# Patient Record
Sex: FEMALE | Race: WHITE | NOT HISPANIC OR LATINO | Employment: OTHER | URBAN - METROPOLITAN AREA
[De-identification: names, ages, dates, MRNs, and addresses within clinical notes are randomized per-mention and may not be internally consistent; named-entity substitution may affect disease eponyms.]

---

## 2019-08-15 ENCOUNTER — APPOINTMENT (OUTPATIENT)
Dept: RADIOLOGY | Facility: MEDICAL CENTER | Age: 74
End: 2019-08-15
Attending: EMERGENCY MEDICINE
Payer: COMMERCIAL

## 2019-08-15 ENCOUNTER — HOSPITAL ENCOUNTER (EMERGENCY)
Facility: MEDICAL CENTER | Age: 74
End: 2019-08-15
Attending: EMERGENCY MEDICINE
Payer: COMMERCIAL

## 2019-08-15 VITALS
SYSTOLIC BLOOD PRESSURE: 139 MMHG | HEART RATE: 83 BPM | TEMPERATURE: 97.7 F | BODY MASS INDEX: 35.41 KG/M2 | OXYGEN SATURATION: 95 % | WEIGHT: 212.52 LBS | DIASTOLIC BLOOD PRESSURE: 83 MMHG | HEIGHT: 65 IN | RESPIRATION RATE: 16 BRPM

## 2019-08-15 DIAGNOSIS — M79.601 PAIN OF RIGHT UPPER EXTREMITY: ICD-10-CM

## 2019-08-15 LAB
ANION GAP SERPL CALC-SCNC: 7 MMOL/L (ref 0–11.9)
BASOPHILS # BLD AUTO: 0.6 % (ref 0–1.8)
BASOPHILS # BLD: 0.03 K/UL (ref 0–0.12)
BUN SERPL-MCNC: 15 MG/DL (ref 8–22)
CALCIUM SERPL-MCNC: 9.3 MG/DL (ref 8.5–10.5)
CHLORIDE SERPL-SCNC: 104 MMOL/L (ref 96–112)
CO2 SERPL-SCNC: 27 MMOL/L (ref 20–33)
CREAT SERPL-MCNC: 0.67 MG/DL (ref 0.5–1.4)
EOSINOPHIL # BLD AUTO: 0.14 K/UL (ref 0–0.51)
EOSINOPHIL NFR BLD: 2.8 % (ref 0–6.9)
ERYTHROCYTE [DISTWIDTH] IN BLOOD BY AUTOMATED COUNT: 50.4 FL (ref 35.9–50)
GLUCOSE SERPL-MCNC: 111 MG/DL (ref 65–99)
HCT VFR BLD AUTO: 38.1 % (ref 37–47)
HGB BLD-MCNC: 12 G/DL (ref 12–16)
IMM GRANULOCYTES # BLD AUTO: 0 K/UL (ref 0–0.11)
IMM GRANULOCYTES NFR BLD AUTO: 0 % (ref 0–0.9)
LYMPHOCYTES # BLD AUTO: 1.55 K/UL (ref 1–4.8)
LYMPHOCYTES NFR BLD: 30.5 % (ref 22–41)
MCH RBC QN AUTO: 29.7 PG (ref 27–33)
MCHC RBC AUTO-ENTMCNC: 31.5 G/DL (ref 33.6–35)
MCV RBC AUTO: 94.3 FL (ref 81.4–97.8)
MONOCYTES # BLD AUTO: 0.52 K/UL (ref 0–0.85)
MONOCYTES NFR BLD AUTO: 10.2 % (ref 0–13.4)
NEUTROPHILS # BLD AUTO: 2.84 K/UL (ref 2–7.15)
NEUTROPHILS NFR BLD: 55.9 % (ref 44–72)
NRBC # BLD AUTO: 0 K/UL
NRBC BLD-RTO: 0 /100 WBC
PLATELET # BLD AUTO: 173 K/UL (ref 164–446)
PMV BLD AUTO: 10.6 FL (ref 9–12.9)
POTASSIUM SERPL-SCNC: 4.2 MMOL/L (ref 3.6–5.5)
RBC # BLD AUTO: 4.04 M/UL (ref 4.2–5.4)
SODIUM SERPL-SCNC: 138 MMOL/L (ref 135–145)
TROPONIN T SERPL-MCNC: 11 NG/L (ref 6–19)
WBC # BLD AUTO: 5.1 K/UL (ref 4.8–10.8)

## 2019-08-15 PROCEDURE — 93005 ELECTROCARDIOGRAM TRACING: CPT | Performed by: EMERGENCY MEDICINE

## 2019-08-15 PROCEDURE — 85025 COMPLETE CBC W/AUTO DIFF WBC: CPT

## 2019-08-15 PROCEDURE — 93971 EXTREMITY STUDY: CPT | Mod: 26,RT | Performed by: INTERNAL MEDICINE

## 2019-08-15 PROCEDURE — 80048 BASIC METABOLIC PNL TOTAL CA: CPT

## 2019-08-15 PROCEDURE — 93971 EXTREMITY STUDY: CPT | Mod: RT

## 2019-08-15 PROCEDURE — 84484 ASSAY OF TROPONIN QUANT: CPT

## 2019-08-15 PROCEDURE — 99284 EMERGENCY DEPT VISIT MOD MDM: CPT

## 2019-08-15 SDOH — HEALTH STABILITY: MENTAL HEALTH: HOW OFTEN DO YOU HAVE A DRINK CONTAINING ALCOHOL?: 2-4 TIMES A MONTH

## 2019-08-15 ASSESSMENT — PAIN DESCRIPTION - DESCRIPTORS: DESCRIPTORS: ACHING

## 2019-08-15 NOTE — ED TRIAGE NOTES
"Cheyanne BROWNLEE Short 73 y.o. female ambulatory to triage with family for     Chief Complaint   Patient presents with   • Arm Pain     pt reports R arm/shoulder pain x 2 months.  Pt reports this week a large vein in her R arm became \"swollen and bruised\".  Pt reports bruising then happened all the way down her arm.  No known injury.   • Bleeding/Bruising     down R lower extremity     Pt denies any history of blood clot.  Pt reports she initially thought she had a pulled muscle and has been working her R arm at PT, but the swollen vein and bruising concerned her.  /76   Pulse 94   Temp 36.5 °C (97.7 °F) (Temporal)   Resp 16   Ht 1.651 m (5' 5\")   Wt 96.4 kg (212 lb 8.4 oz)   SpO2 94%   BMI 35.37 kg/m²   Pt placed in the senior lobby to await room assignment.  Advised to return to the triage desk for any changes/concerns.  "

## 2019-08-15 NOTE — ED NOTES
Pt c/o achy pain to right upper arm x2 months, progressively worsening. Pt noted bruising to forearm with no known injury. Pt denies taking blood thinners, denies hx of blood clot. Skin is pink warm and dry.

## 2019-08-15 NOTE — ED PROVIDER NOTES
"ED Provider Note    Scribed for Jaime Lowe M.D. by Yovani Pulido. 8/15/2019  2:02 PM    Primary care provider: No primary care provider noted.  Means of arrival: Walk-in  History obtained from: Patient  History limited by: None    CHIEF COMPLAINT  Chief Complaint   Patient presents with   • Arm Pain     pt reports R arm/shoulder pain x 2 months.  Pt reports this week a large vein in her R arm became \"swollen and bruised\".  Pt reports bruising then happened all the way down her arm.  No known injury.   • Bleeding/Bruising     down R lower extremity       HPI  Cheyanne Monsalve is a 73 y.o. female who presents to the Emergency Department for evaluation of transient right arm pain, onset two months ago. Since the original onset she states that her arm pain has been progressively worsening, rating it an 8/10. She notes associated  aching in her right arm. The patient endorses nausea, vomiting, lightheadedness, abdominal pain, and right arm bruising. She adds that she occasionally gets twinges that are in her neck and radiate into her back, but it is not as noticeable as her arm. She denies swelling, fever, or diarrhea. She states that she has medical history of arthritis in her back.       REVIEW OF SYSTEMS  Pertinent positives include aching in her right arm, right arm pain, nausea, vomiting, lightheadedness, abdominal pain, right arm bruising. Pertinent negatives include no arm swelling, fever, or diarrhea.  All other systems reviewed and negative.    PAST MEDICAL HISTORY   has a past medical history of Asthma.    SURGICAL HISTORY  patient denies any surgical history    SOCIAL HISTORY  Social History     Tobacco Use   • Smoking status: Never Smoker   • Smokeless tobacco: Never Used   Substance Use Topics   • Alcohol use: Yes     Frequency: 2-4 times a month     Comment: social   • Drug use: Yes     Types: Oral     Comment: teresa       Social History     Substance and Sexual Activity   Drug Use Yes   • Types: " "Oral    Comment: teresa        FAMILY HISTORY  History reviewed. No pertinent family history.    CURRENT MEDICATIONS  No current facility-administered medications on file prior to encounter.      No current outpatient medications on file prior to encounter.         ALLERGIES  Allergies   Allergen Reactions   • Amoxicillin Rash     \"red rash all over\"       PHYSICAL EXAM  VITAL SIGNS: /76   Pulse 94   Temp 36.5 °C (97.7 °F) (Temporal)   Resp 16   Ht 1.651 m (5' 5\")   Wt 96.4 kg (212 lb 8.4 oz)   SpO2 94%   BMI 35.37 kg/m²     Vital signs reviewed.  Constitutional:  Appears well-developed and well-nourished. No distress.   Head: Normocephalic.   Mouth/Throat: Oropharynx is clear and moist.   Eyes: EOM are normal. Pupils are equal, round, and reactive to light.   Neck: Normal range of motion. Neck supple. Tenderness over the left paraspinal muscles and left trapezius.  Right paraspinal muscles are nontender  Cardiovascular: Normal rate, regular rhythm and normal heart sounds.    Pulmonary/Chest: Effort normal and breath sounds normal. No wheezes.   Abdominal: Soft. There is no tenderness. There is no rebound and no guarding.   Musculoskeletal: Exhibits no edema.   Lymphadenopathy: No cervical adenopathy.   Neurological: Patient is alert and oriented to person, place, and time. CNs II - XII intact. DTRs intact. Normal sensation and strength.  Skin: Skin is warm and dry.   Psychiatric: Patient has a normal mood and affect. Behavior is normal.     LABS  Results for orders placed or performed during the hospital encounter of 08/15/19   CBC WITH DIFFERENTIAL   Result Value Ref Range    WBC 5.1 4.8 - 10.8 K/uL    RBC 4.04 (L) 4.20 - 5.40 M/uL    Hemoglobin 12.0 12.0 - 16.0 g/dL    Hematocrit 38.1 37.0 - 47.0 %    MCV 94.3 81.4 - 97.8 fL    MCH 29.7 27.0 - 33.0 pg    MCHC 31.5 (L) 33.6 - 35.0 g/dL    RDW 50.4 (H) 35.9 - 50.0 fL    Platelet Count 173 164 - 446 K/uL    MPV 10.6 9.0 - 12.9 fL    " Neutrophils-Polys 55.90 44.00 - 72.00 %    Lymphocytes 30.50 22.00 - 41.00 %    Monocytes 10.20 0.00 - 13.40 %    Eosinophils 2.80 0.00 - 6.90 %    Basophils 0.60 0.00 - 1.80 %    Immature Granulocytes 0.00 0.00 - 0.90 %    Nucleated RBC 0.00 /100 WBC    Neutrophils (Absolute) 2.84 2.00 - 7.15 K/uL    Lymphs (Absolute) 1.55 1.00 - 4.80 K/uL    Monos (Absolute) 0.52 0.00 - 0.85 K/uL    Eos (Absolute) 0.14 0.00 - 0.51 K/uL    Baso (Absolute) 0.03 0.00 - 0.12 K/uL    Immature Granulocytes (abs) 0.00 0.00 - 0.11 K/uL    NRBC (Absolute) 0.00 K/uL   BASIC METABOLIC PANEL   Result Value Ref Range    Sodium 138 135 - 145 mmol/L    Potassium 4.2 3.6 - 5.5 mmol/L    Chloride 104 96 - 112 mmol/L    Co2 27 20 - 33 mmol/L    Glucose 111 (H) 65 - 99 mg/dL    Bun 15 8 - 22 mg/dL    Creatinine 0.67 0.50 - 1.40 mg/dL    Calcium 9.3 8.5 - 10.5 mg/dL    Anion Gap 7.0 0.0 - 11.9   TROPONIN   Result Value Ref Range    Troponin T 11 6 - 19 ng/L   ESTIMATED GFR   Result Value Ref Range    GFR If African American >60 >60 mL/min/1.73 m 2    GFR If Non African American >60 >60 mL/min/1.73 m 2   EKG   Result Value Ref Range    Report       [facility]    Test Date:  2019-08-15  Pt Name:    RACHEL EVANS                Department: ER  MRN:        [id number]                      Room:       [facility]  Gender:     Female                       Technician: [id number]  :        [id number]                   Requested By:MAYA FERGUSON  Order #:    [id number]                    Reading MD:    Measurements  Intervals                                Axis  Rate:       76                           P:          35  DE:         156                          QRS:        -53  QRSD:       140                          T:          53  QT:         444  QTc:        500    Interpretive Statements  SINUS RHYTHM  LEFT BUNDLE BRANCH BLOCK  No previous ECG available for comparison       All labs reviewed by me.    EK Lead EKG interpreted  by me to show sinus rhythm at 76. Normal P waves. Normal QRS. Normal ST segments. Normal T waves. abnormal EKG.     RADIOLOGY  US-EXTREMITY VENOUS UPPER UNILAT RIGHT   Final Result        The radiologist's interpretation of all radiological studies have been reviewed by me.  Ultrasound was negative for DVT    COURSE & MEDICAL DECISION MAKING  Pertinent Labs & Imaging studies reviewed. (See chart for details) The patient's [facility] and past medical  records were reviewed.  Patient is visiting from out of town    2:02 PM - Patient seen and examined at bedside. Ordered US extremity venous upper unilat right, CBC w/, BMP, and Troponin to evaluate her symptoms. The differential diagnoses include but are not limited to: Pneumonia, MI, blood clot.    3:16 PM - The lab results have been returned and reviewed    3:29 PM - Patient was reevaluated at bedside. Discussed lab and radiology results with the patient and informed them of the findings.  There is certainly no sign of stroke or MI.  Her arm pain is somewhat atypical but may represent a cervical radiculopathy    4:30 PM - The patient was updated and informed of her discharge.    The patient will return for new or worsening symptoms and is stable at the time of discharge.    The patient is referred to a primary physician for blood pressure management, diabetic screening, and for all other preventative health concerns.    DISPOSITION:  Patient will be discharged home in stable condition.    FOLLOW UP:  Your doctor            OUTPATIENT MEDICATIONS:  There are no discharge medications for this patient.      FINAL IMPRESSION  1. Pain of right upper extremity          Yovani MONSON (Danielle), am scribing for, and in the presence of, Jaime Lowe M.D..    Electronically signed by: Yovani Knox), 8/15/2019    Jaime MONSON M.D. personally performed the services described in this documentation, as scribed by Yovani Pulido in my presence, and it is  both accurate and complete. C    The note accurately reflects work and decisions made by me.  Jaime Lowe  8/15/2019  6:53 PM

## 2019-08-25 LAB — EKG IMPRESSION: NORMAL
